# Patient Record
Sex: FEMALE | URBAN - METROPOLITAN AREA
[De-identification: names, ages, dates, MRNs, and addresses within clinical notes are randomized per-mention and may not be internally consistent; named-entity substitution may affect disease eponyms.]

---

## 2023-05-16 ENCOUNTER — OFFICE VISIT (OUTPATIENT)
Facility: LOCATION | Age: 58
End: 2023-05-16
Payer: MEDICAID

## 2023-05-16 DIAGNOSIS — H35.033 HYPERTENSIVE RETINOPATHY, BILATERAL: ICD-10-CM

## 2023-05-16 DIAGNOSIS — H40.023 OPEN ANGLE WITH BORDERLINE FINDINGS, HIGH RISK, BILATERAL: ICD-10-CM

## 2023-05-16 DIAGNOSIS — H25.13 AGE-RELATED NUCLEAR CATARACT, BILATERAL: Primary | ICD-10-CM

## 2023-05-16 DIAGNOSIS — H04.123 TEAR FILM INSUFFICIENCY OF BILATERAL LACRIMAL GLANDS: ICD-10-CM

## 2023-05-16 PROCEDURE — 92004 COMPRE OPH EXAM NEW PT 1/>: CPT | Performed by: OPTOMETRIST

## 2023-05-16 RX ORDER — PREDNISOLONE ACETATE 10 MG/ML
1 % SUSPENSION/ DROPS OPHTHALMIC
Qty: 5 | Refills: 1 | Status: ACTIVE
Start: 2023-05-16

## 2023-05-16 ASSESSMENT — VISUAL ACUITY
OS: 20/20
OD: 20/20

## 2023-05-16 ASSESSMENT — KERATOMETRY
OS: 46.38
OD: 46.75

## 2023-05-16 ASSESSMENT — INTRAOCULAR PRESSURE
OD: 16
OS: 16

## 2023-05-16 NOTE — IMPRESSION/PLAN
Impression: Open angle with borderline findings, high risk, bilateral: H40.023. Plan: Discussed findings of today's exam with patient, including diagnosis in detail. Would like patient to return for testing to rule out Glaucoma. Recommend RNFL, Pachymetry, and Glaucoma Evaluation next available. Patient aware this appointment will take longer than the average appointment. Call with questions, changes in vision or new symptoms. RTC: 4-6 week IOP/dry eye check with RNFL/Pach.

## 2023-05-16 NOTE — IMPRESSION/PLAN
Impression: Tear film insufficiency of bilateral lacrimal glands: H04.123.
- FML and Loteprednol not covered by insurance - Prednisolone (05/16/2023) Plan: Dry eyes account for the patient's complaints. There is no evidence of permanent changes to the cornea. Explained condition does not have a cure and will need artificial tears for maintenance. Patient reports AT alone have not improved symptoms. Discussed monitoring vs initiating dry eye treatment. Patient elects treatment. Recommend starting Prednisolone BID OU (ERx'd). SE profile of steroid tx discussed. Patient elects to continue. Patient instructed to use artificial tears 4x a day, warm compresses with a dry eye mask QHS, Ocusoft lid wipes QAM, and Omega 3/Fish oil supplementation 1,000mg BID best taken with food. Patient to call back if symptoms do not improve in  or symptoms worsen to discuss further intervention.

## 2023-06-13 ENCOUNTER — OFFICE VISIT (OUTPATIENT)
Facility: LOCATION | Age: 58
End: 2023-06-13
Payer: MEDICAID

## 2023-06-13 DIAGNOSIS — H40.023 OPEN ANGLE WITH BORDERLINE FINDINGS, HIGH RISK, BILATERAL: Primary | ICD-10-CM

## 2023-06-13 DIAGNOSIS — H04.123 TEAR FILM INSUFFICIENCY OF BILATERAL LACRIMAL GLANDS: ICD-10-CM

## 2023-06-13 DIAGNOSIS — H52.4 PRESBYOPIA: ICD-10-CM

## 2023-06-13 PROCEDURE — 99213 OFFICE O/P EST LOW 20 MIN: CPT | Performed by: OPTOMETRIST

## 2023-06-13 PROCEDURE — 92133 CPTRZD OPH DX IMG PST SGM ON: CPT | Performed by: OPTOMETRIST

## 2023-06-13 ASSESSMENT — INTRAOCULAR PRESSURE
OD: 17
OS: 16

## 2023-06-13 ASSESSMENT — VISUAL ACUITY
OS: 20/20
OD: 20/20

## 2023-06-13 NOTE — IMPRESSION/PLAN
Impression: Open angle with borderline findings, high risk, bilateral: H40.023. Plan: Patient advised of stable condition and IOP stable without medication. Pach and RNFL performed and evaluated at today's examination- Thin pachs and no thinning OU. Will continue to monitor intraocular pressure. No treatment is being implemented at this time. RTC: 6 month IOP check with VF 24-2.

## 2023-06-13 NOTE — IMPRESSION/PLAN
Impression: Tear film insufficiency of bilateral lacrimal glands: H04.123.
- FML not covered by insurance - Loteprednol/Lotemax not covered by insurance - Improved with Prednisolone Plan: Condition has improved. Dry eyes account for the patient's complaints. There is no evidence of permanent changes to the cornea. Explained condition does not have a cure and will need artificial tears for maintenance. Patient instructed to d/c Prednisolone and continue artificial tears PRN OU. Patient to call back if symptoms do not improve or symptoms worsen to discuss further intervention.

## 2024-08-02 ENCOUNTER — OFFICE VISIT (OUTPATIENT)
Facility: LOCATION | Age: 59
End: 2024-08-02
Payer: MEDICAID

## 2024-08-02 DIAGNOSIS — H40.023 OPEN ANGLE WITH BORDERLINE FINDINGS, HIGH RISK, BILATERAL: Primary | ICD-10-CM

## 2024-08-02 DIAGNOSIS — H04.123 TEAR FILM INSUFFICIENCY OF BILATERAL LACRIMAL GLANDS: ICD-10-CM

## 2024-08-02 DIAGNOSIS — H52.4 PRESBYOPIA: ICD-10-CM

## 2024-08-02 DIAGNOSIS — H25.13 AGE-RELATED NUCLEAR CATARACT, BILATERAL: ICD-10-CM

## 2024-08-02 PROCEDURE — 92134 CPTRZ OPH DX IMG PST SGM RTA: CPT | Performed by: OPTOMETRIST

## 2024-08-02 PROCEDURE — 92014 COMPRE OPH EXAM EST PT 1/>: CPT | Performed by: OPTOMETRIST

## 2024-08-02 PROCEDURE — 76514 ECHO EXAM OF EYE THICKNESS: CPT | Performed by: OPTOMETRIST

## 2024-08-02 PROCEDURE — 92133 CPTRZD OPH DX IMG PST SGM ON: CPT | Performed by: OPTOMETRIST

## 2024-08-02 ASSESSMENT — KERATOMETRY
OS: 46.38
OD: 46.38

## 2024-08-02 ASSESSMENT — INTRAOCULAR PRESSURE
OS: 16
OD: 16

## 2024-08-02 ASSESSMENT — VISUAL ACUITY
OS: 20/25
OD: 20/25

## 2025-07-29 ENCOUNTER — OFFICE VISIT (OUTPATIENT)
Facility: LOCATION | Age: 60
End: 2025-07-29
Payer: MEDICAID

## 2025-07-29 DIAGNOSIS — H40.023 OPEN ANGLE WITH BORDERLINE FINDINGS, HIGH RISK, BILATERAL: Primary | ICD-10-CM

## 2025-07-29 DIAGNOSIS — H04.123 TEAR FILM INSUFFICIENCY OF BILATERAL LACRIMAL GLANDS: ICD-10-CM

## 2025-07-29 DIAGNOSIS — H25.13 AGE-RELATED NUCLEAR CATARACT, BILATERAL: ICD-10-CM

## 2025-07-29 PROCEDURE — 92083 EXTENDED VISUAL FIELD XM: CPT | Performed by: OPTOMETRIST

## 2025-07-29 PROCEDURE — 99213 OFFICE O/P EST LOW 20 MIN: CPT | Performed by: OPTOMETRIST

## 2025-07-29 RX ORDER — FLUOROMETHOLONE 1 MG/ML
0.1 % SUSPENSION/ DROPS OPHTHALMIC
Qty: 5 | Refills: 0 | Status: ACTIVE
Start: 2025-07-29

## 2025-07-29 ASSESSMENT — INTRAOCULAR PRESSURE
OD: 16
OS: 17

## 2025-07-29 ASSESSMENT — KERATOMETRY
OS: 46.38
OD: 46.50

## 2025-08-19 ENCOUNTER — OFFICE VISIT (OUTPATIENT)
Facility: LOCATION | Age: 60
End: 2025-08-19
Payer: MEDICAID

## 2025-08-19 DIAGNOSIS — H16.142 PUNCTATE KERATITIS OF LEFT EYE: ICD-10-CM

## 2025-08-19 DIAGNOSIS — H04.123 TEAR FILM INSUFFICIENCY OF BILATERAL LACRIMAL GLANDS: Primary | ICD-10-CM

## 2025-08-19 PROCEDURE — 99212 OFFICE O/P EST SF 10 MIN: CPT | Performed by: OPTOMETRIST

## 2025-08-19 RX ORDER — CYCLOSPORINE 0.5 MG/ML
0.05 % EMULSION OPHTHALMIC
Qty: 60 | Refills: 2 | Status: ACTIVE
Start: 2025-08-19

## 2025-08-19 ASSESSMENT — INTRAOCULAR PRESSURE
OD: 16
OS: 16